# Patient Record
Sex: MALE | Race: WHITE | NOT HISPANIC OR LATINO
[De-identification: names, ages, dates, MRNs, and addresses within clinical notes are randomized per-mention and may not be internally consistent; named-entity substitution may affect disease eponyms.]

---

## 2017-04-13 ENCOUNTER — APPOINTMENT (OUTPATIENT)
Dept: ORTHOPEDIC SURGERY | Facility: CLINIC | Age: 37
End: 2017-04-13

## 2017-08-02 ENCOUNTER — RX RENEWAL (OUTPATIENT)
Age: 37
End: 2017-08-02

## 2017-08-04 ENCOUNTER — APPOINTMENT (OUTPATIENT)
Dept: ORTHOPEDIC SURGERY | Facility: CLINIC | Age: 37
End: 2017-08-04
Payer: OTHER MISCELLANEOUS

## 2017-08-04 VITALS — HEIGHT: 73 IN | WEIGHT: 215 LBS | BODY MASS INDEX: 28.49 KG/M2

## 2017-08-04 PROCEDURE — 99214 OFFICE O/P EST MOD 30 MIN: CPT

## 2017-08-04 RX ORDER — OMEGA-3-ACID ETHYL ESTERS CAPSULES 1 G/1
1 CAPSULE, LIQUID FILLED ORAL
Qty: 180 | Refills: 0 | Status: ACTIVE | COMMUNITY
Start: 2017-02-18

## 2017-10-19 ENCOUNTER — APPOINTMENT (OUTPATIENT)
Dept: ORTHOPEDIC SURGERY | Facility: CLINIC | Age: 37
End: 2017-10-19
Payer: OTHER MISCELLANEOUS

## 2017-10-19 PROCEDURE — 99214 OFFICE O/P EST MOD 30 MIN: CPT

## 2018-01-10 ENCOUNTER — TRANSCRIPTION ENCOUNTER (OUTPATIENT)
Age: 38
End: 2018-01-10

## 2018-02-13 ENCOUNTER — APPOINTMENT (OUTPATIENT)
Dept: ORTHOPEDIC SURGERY | Facility: CLINIC | Age: 38
End: 2018-02-13
Payer: OTHER MISCELLANEOUS

## 2018-02-13 ENCOUNTER — TRANSCRIPTION ENCOUNTER (OUTPATIENT)
Age: 38
End: 2018-02-13

## 2018-02-13 DIAGNOSIS — M54.5 LOW BACK PAIN: ICD-10-CM

## 2018-02-13 DIAGNOSIS — M54.6 PAIN IN THORACIC SPINE: ICD-10-CM

## 2018-02-13 PROCEDURE — 99214 OFFICE O/P EST MOD 30 MIN: CPT

## 2018-02-13 RX ORDER — KETOCONAZOLE 20 MG/G
2 GEL TOPICAL
Qty: 45 | Refills: 0 | Status: ACTIVE | COMMUNITY
Start: 2017-01-16

## 2018-02-13 RX ORDER — CICLOPIROX 10 MG/.96ML
1 SHAMPOO TOPICAL
Qty: 120 | Refills: 0 | Status: ACTIVE | COMMUNITY
Start: 2017-01-16

## 2018-02-13 RX ORDER — NAPROXEN 500 MG/1
500 TABLET ORAL
Qty: 60 | Refills: 5 | Status: ACTIVE | COMMUNITY
Start: 2017-10-19 | End: 1900-01-01

## 2018-05-08 ENCOUNTER — APPOINTMENT (OUTPATIENT)
Dept: ORTHOPEDIC SURGERY | Facility: CLINIC | Age: 38
End: 2018-05-08

## 2018-10-29 ENCOUNTER — APPOINTMENT (OUTPATIENT)
Dept: GASTROENTEROLOGY | Facility: CLINIC | Age: 38
End: 2018-10-29
Payer: COMMERCIAL

## 2018-10-29 VITALS
DIASTOLIC BLOOD PRESSURE: 73 MMHG | HEIGHT: 73 IN | SYSTOLIC BLOOD PRESSURE: 100 MMHG | HEART RATE: 65 BPM | OXYGEN SATURATION: 98 % | TEMPERATURE: 98.7 F | RESPIRATION RATE: 17 BRPM

## 2018-10-29 DIAGNOSIS — Z86.39 PERSONAL HISTORY OF OTHER ENDOCRINE, NUTRITIONAL AND METABOLIC DISEASE: ICD-10-CM

## 2018-10-29 DIAGNOSIS — Z78.9 OTHER SPECIFIED HEALTH STATUS: ICD-10-CM

## 2018-10-29 DIAGNOSIS — Z82.49 FAMILY HISTORY OF ISCHEMIC HEART DISEASE AND OTHER DISEASES OF THE CIRCULATORY SYSTEM: ICD-10-CM

## 2018-10-29 DIAGNOSIS — Z87.09 PERSONAL HISTORY OF OTHER DISEASES OF THE RESPIRATORY SYSTEM: ICD-10-CM

## 2018-10-29 PROCEDURE — 99244 OFF/OP CNSLTJ NEW/EST MOD 40: CPT

## 2018-11-08 ENCOUNTER — APPOINTMENT (OUTPATIENT)
Dept: GASTROENTEROLOGY | Facility: AMBULATORY MEDICAL SERVICES | Age: 38
End: 2018-11-08
Payer: COMMERCIAL

## 2018-11-08 PROCEDURE — 43239 EGD BIOPSY SINGLE/MULTIPLE: CPT

## 2018-12-03 ENCOUNTER — APPOINTMENT (OUTPATIENT)
Dept: GASTROENTEROLOGY | Facility: CLINIC | Age: 38
End: 2018-12-03
Payer: COMMERCIAL

## 2018-12-03 VITALS
WEIGHT: 184 LBS | BODY MASS INDEX: 24.39 KG/M2 | TEMPERATURE: 98.3 F | DIASTOLIC BLOOD PRESSURE: 56 MMHG | HEART RATE: 68 BPM | HEIGHT: 73 IN | OXYGEN SATURATION: 98 % | SYSTOLIC BLOOD PRESSURE: 98 MMHG

## 2018-12-03 DIAGNOSIS — R63.4 ABNORMAL WEIGHT LOSS: ICD-10-CM

## 2018-12-03 PROCEDURE — 99214 OFFICE O/P EST MOD 30 MIN: CPT

## 2018-12-15 ENCOUNTER — LABORATORY RESULT (OUTPATIENT)
Age: 38
End: 2018-12-15

## 2018-12-17 LAB
ALBUMIN SERPL ELPH-MCNC: 4.8 G/DL
ALP BLD-CCNC: 45 U/L
ALT SERPL-CCNC: 26 U/L
ANION GAP SERPL CALC-SCNC: 12 MMOL/L
APPEARANCE: CLEAR
AST SERPL-CCNC: 19 U/L
BACTERIA: NEGATIVE
BASOPHILS # BLD AUTO: 0.02 K/UL
BASOPHILS NFR BLD AUTO: 0.3 %
BILIRUB SERPL-MCNC: 0.8 MG/DL
BILIRUBIN URINE: NEGATIVE
BLOOD URINE: NEGATIVE
BUN SERPL-MCNC: 22 MG/DL
CALCIUM SERPL-MCNC: 9.9 MG/DL
CHLORIDE SERPL-SCNC: 101 MMOL/L
CHOLEST SERPL-MCNC: 184 MG/DL
CHOLEST/HDLC SERPL: 4.8 RATIO
CO2 SERPL-SCNC: 29 MMOL/L
COLOR: YELLOW
CREAT SERPL-MCNC: 0.86 MG/DL
EOSINOPHIL # BLD AUTO: 0.13 K/UL
EOSINOPHIL NFR BLD AUTO: 1.7 %
ERYTHROCYTE [SEDIMENTATION RATE] IN BLOOD BY WESTERGREN METHOD: 2 MM/HR
GLUCOSE QUALITATIVE U: NEGATIVE MG/DL
GLUCOSE SERPL-MCNC: 98 MG/DL
HCT VFR BLD CALC: 42.5 %
HDLC SERPL-MCNC: 38 MG/DL
HGB BLD-MCNC: 14.1 G/DL
HYALINE CASTS: 0 /LPF
IMM GRANULOCYTES NFR BLD AUTO: 0.3 %
KETONES URINE: NEGATIVE
LDLC SERPL CALC-MCNC: 123 MG/DL
LEUKOCYTE ESTERASE URINE: NEGATIVE
LYMPHOCYTES # BLD AUTO: 2.01 K/UL
LYMPHOCYTES NFR BLD AUTO: 27.1 %
MAN DIFF?: NORMAL
MCHC RBC-ENTMCNC: 28.9 PG
MCHC RBC-ENTMCNC: 33.2 GM/DL
MCV RBC AUTO: 87.1 FL
MICROSCOPIC-UA: NORMAL
MONOCYTES # BLD AUTO: 0.77 K/UL
MONOCYTES NFR BLD AUTO: 10.4 %
NEUTROPHILS # BLD AUTO: 4.48 K/UL
NEUTROPHILS NFR BLD AUTO: 60.2 %
NITRITE URINE: NEGATIVE
PH URINE: 5.5
PLATELET # BLD AUTO: 257 K/UL
POTASSIUM SERPL-SCNC: 3.8 MMOL/L
PROT SERPL-MCNC: 7.1 G/DL
PROTEIN URINE: NEGATIVE MG/DL
RBC # BLD: 4.88 M/UL
RBC # FLD: 12.9 %
RED BLOOD CELLS URINE: 0 /HPF
SODIUM SERPL-SCNC: 142 MMOL/L
SPECIFIC GRAVITY URINE: 1.02
SQUAMOUS EPITHELIAL CELLS: 0 /HPF
TRIGL SERPL-MCNC: 114 MG/DL
TSH SERPL-ACNC: 1.06 UIU/ML
TTG IGA SER IA-ACNC: <5 UNITS
TTG IGA SER-ACNC: NEGATIVE
TTG IGG SER IA-ACNC: <5 UNITS
TTG IGG SER IA-ACNC: NEGATIVE
UROBILINOGEN URINE: NEGATIVE MG/DL
WBC # FLD AUTO: 7.43 K/UL
WHITE BLOOD CELLS URINE: 1 /HPF

## 2018-12-18 LAB
ENDOMYSIUM IGA SER QL: NEGATIVE
ENDOMYSIUM IGA TITR SER: NORMAL

## 2018-12-19 LAB
GLIADIN IGA SER QL: <5 UNITS
GLIADIN IGG SER QL: <5 UNITS
GLIADIN PEPTIDE IGA SER-ACNC: NEGATIVE
GLIADIN PEPTIDE IGG SER-ACNC: NEGATIVE

## 2019-01-07 ENCOUNTER — APPOINTMENT (OUTPATIENT)
Dept: GASTROENTEROLOGY | Facility: CLINIC | Age: 39
End: 2019-01-07
Payer: COMMERCIAL

## 2019-01-07 VITALS
SYSTOLIC BLOOD PRESSURE: 118 MMHG | BODY MASS INDEX: 24.65 KG/M2 | WEIGHT: 186 LBS | HEIGHT: 73 IN | HEART RATE: 76 BPM | OXYGEN SATURATION: 99 % | TEMPERATURE: 98.7 F | DIASTOLIC BLOOD PRESSURE: 75 MMHG

## 2019-01-07 PROCEDURE — 99214 OFFICE O/P EST MOD 30 MIN: CPT

## 2019-01-07 RX ORDER — OMEPRAZOLE 40 MG/1
40 CAPSULE, DELAYED RELEASE ORAL
Qty: 30 | Refills: 3 | Status: ACTIVE | COMMUNITY
Start: 2018-12-03

## 2019-01-07 NOTE — PHYSICAL EXAM
[General Appearance - Alert] : alert [General Appearance - In No Acute Distress] : in no acute distress [Sclera] : the sclera and conjunctiva were normal [PERRL With Normal Accommodation] : pupils were equal in size, round, and reactive to light [Extraocular Movements] : extraocular movements were intact [Outer Ear] : the ears and nose were normal in appearance [Oropharynx] : the oropharynx was normal [Neck Appearance] : the appearance of the neck was normal [Neck Cervical Mass (___cm)] : no neck mass was observed [Jugular Venous Distention Increased] : there was no jugular-venous distention [Thyroid Diffuse Enlargement] : the thyroid was not enlarged [Thyroid Nodule] : there were no palpable thyroid nodules [Auscultation Breath Sounds / Voice Sounds] : lungs were clear to auscultation bilaterally [Heart Rate And Rhythm] : heart rate was normal and rhythm regular [Heart Sounds] : normal S1 and S2 [Heart Sounds Gallop] : no gallops [Murmurs] : no murmurs [Heart Sounds Pericardial Friction Rub] : no pericardial rub [Bowel Sounds] : normal bowel sounds [Abdomen Soft] : soft [Abdomen Tenderness] : non-tender [] : no hepato-splenomegaly [Abdomen Mass (___ Cm)] : no abdominal mass palpated [No CVA Tenderness] : no ~M costovertebral angle tenderness [No Spinal Tenderness] : no spinal tenderness [Abnormal Walk] : normal gait [Nail Clubbing] : no clubbing  or cyanosis of the fingernails [Musculoskeletal - Swelling] : no joint swelling seen [Motor Tone] : muscle strength and tone were normal [Oriented To Time, Place, And Person] : oriented to person, place, and time [Impaired Insight] : insight and judgment were intact [Affect] : the affect was normal

## 2019-01-07 NOTE — ASSESSMENT
[FreeTextEntry1] : Patient with mild gastritis and esophagitis. He will continue omeprazole 40 mg daily. He has been exposed to some noxious stimuli. There were old and they are playing in his vague constitutional symptoms is unclear.\par We will continue to monitor his weight. Recent blood work was perfectly normal.

## 2019-01-07 NOTE — HISTORY OF PRESENT ILLNESS
[FreeTextEntry1] : Patient is a 38-year-old gentleman who is status post an upper endoscopy. Endoscopy was essentially normal. Biopsies of the esophagus revealed evidence of reflux esophagitis and biopsies of the stomach revealed evidence of intestinal metaplasia. Patient may have been exposed to certain noxious stimuli. He is having some vague constitutional symptoms.\par Weight has been stable. Even gained 10lbs.

## 2019-02-10 ENCOUNTER — TRANSCRIPTION ENCOUNTER (OUTPATIENT)
Age: 39
End: 2019-02-10

## 2019-05-10 ENCOUNTER — TRANSCRIPTION ENCOUNTER (OUTPATIENT)
Age: 39
End: 2019-05-10

## 2019-05-21 ENCOUNTER — OUTPATIENT (OUTPATIENT)
Dept: OUTPATIENT SERVICES | Facility: HOSPITAL | Age: 39
LOS: 1 days | Discharge: HOME | End: 2019-05-21
Payer: COMMERCIAL

## 2019-05-21 DIAGNOSIS — H81.43 VERTIGO OF CENTRAL ORIGIN, BILATERAL: ICD-10-CM

## 2019-05-21 DIAGNOSIS — H90.3 SENSORINEURAL HEARING LOSS, BILATERAL: ICD-10-CM

## 2019-05-21 DIAGNOSIS — H69.83 OTHER SPECIFIED DISORDERS OF EUSTACHIAN TUBE, BILATERAL: ICD-10-CM

## 2019-05-21 DIAGNOSIS — R13.10 DYSPHAGIA, UNSPECIFIED: ICD-10-CM

## 2019-05-21 PROCEDURE — 74230 X-RAY XM SWLNG FUNCJ C+: CPT | Mod: 26

## 2019-05-21 NOTE — SWALLOW VFSS/MBS ASSESSMENT ADULT - PHARYNGEAL PHASE COMMENTS
Swallow was triggered in an acceptable time frame for age, Hyolaryngeal excursion and epiglottic retroflexion during the swallow were mildly reduced but felt to be functional with grossly adequate prandial airway protection nonetheless. Moreover, superior to inferior pharyngeal motility was mildly reduced but also felt to be functional. Cricopharyngeal sphincter opening was somewhat rigid but CP sphincter did readily open to allow passage of food materials into the esophagus without obstruction. NO LARYNGEAL PENETRATION OR ASPIRATION WERE DEMONSTRATED UNDER FLUOROSCOPIC CONTROL. HOWEVER, IT IS NOTED THAT A MILD AMOUNT OF POST PRANDIAL PHARYNGEAL STASIS WAS RETAINED IN THE VALLECULAE/PYRIFORM SINUSES POST PRANDIALLY WITH PUREED FOODS AS WELL AS SOLID FOOD MATERIALS. IT SHOULD BE NOTED THAT EMPLOYMENT OF CYCLIC INGESTION(I.E ALTERNATING LIQUIDS WITH MORE COARSE FOODS) AND/OR EMPLOYMENT OF A REPEAT DRY SWALLOW AFTER PRIMARY SWALLOWING TRIALS WAS EFFECTIVE IN CLEARING PHARYNGEAL RESIDUE. Swallow was triggered in an acceptable time frame for age, Hyolaryngeal excursion and epiglottic retroflexion during the swallow were mildly reduced but felt to be functional with grossly adequate prandial airway protection nonetheless. Moreover, superior to inferior pharyngeal motility was mildly reduced but also felt to be functional. Cricopharyngeal sphincter opening was somewhat rigid but CP sphincter did readily open to allow passage of food materials into the esophagus without obstruction. NO LARYNGEAL PENETRATION OR ASPIRATION WERE DEMONSTRATED UNDER FLUOROSCOPIC CONTROL. HOWEVER, IT IS NOTED THAT A MILD AMOUNT OF POST PRANDIAL PHARYNGEAL STASIS WAS RETAINED IN THE VALLECULAE/PYRIFORM SINUSES POST PRANDIALLY WITH PUREED FOODS AS WELL AS SOLID FOOD MATERIALS. IT SHOULD BE FURTHER NOTED THAT EMPLOYMENT OF CYCLIC INGESTION(I.E ALTERNATING LIQUIDS WITH MORE COARSE FOODS) AND/OR EMPLOYMENT OF A REPEAT DRY SWALLOW AFTER PRIMARY SWALLOWING TRIALS WAS EFFECTIVE IN CLEARING PHARYNGEAL RESIDUE.

## 2019-05-21 NOTE — SWALLOW VFSS/MBS ASSESSMENT ADULT - ADDITIONAL RECOMMENDATIONS
1) SUGGEST A REGULAR TEXTURE DIET WITH THIN LIQUID CONSISTENCIES AS THE PATIENT APPEARS CLINICALLY TOLERANT OF THESE FOOD CONSISTENCIES FROM AN OROPHARYNGEAL SWALLOWING STANCE ON EXAM, DESPITE MILD PHARYNGEAL DYSPHAGIA.     2) THE PATIENT SHOULD EAT AT A 90 DEGREE ANGLE AND REMAIN UPRIGHT AT LEAST 20 MINUTES AFTER MEALS. HE SHOULD ALSO ALTERNATE LIQUIDS WITH MORE COARSE FOODS OFTEN AND PERIODICALLY EMPLOY A DRY SWALLOW AFTER PRIMARY SWALLOWING TRIALS AT TIMES. THESE STRATEGIES ARE DESIGNED TO ENHANCE SWALLOWING SAFETY/EFFICIENCY.     3) CONSIDER A TRIAL PERIOD OF SWALLOWING THERAPY WITH A SPEECH PATHOLOGIST. I CAN BE CONTACTED FOR REFERRALS  [phone].    4) CONSIDER A CONSULT WITH A NEUROLOGIST TO RULE OUT A NEUROGENIC CAUSE OF HIS PHARYNGEAL DYSPHAGIA.     5) RECONSULT PRN.

## 2019-05-21 NOTE — SWALLOW VFSS/MBS ASSESSMENT ADULT - DIAGNOSTIC IMPRESSIONS
THE PATIENT DEMONSTRATES AGE ACCEPTABLE FUNCTIONAL ORAL SWALLOWING ATOP MILD PHARYNGEAL DYSPHAGIA WHICH IS FELT TO BE A FUNCTIONAL CONDITION. PHARYNGEAL DYSPHAGIA IS CHARACTERIZED BY MILDLY REDUCED BUT FUNCTIONAL HYOLARYNGEAL EXCURSION DURING SWALLOWING TRIALS AND MILD RIGIDITY OF CRICOPHARYNGEAL SPHINCTER UPON OPENING.  NO LARYNGEAL PENETRATION OR ASPIRATION WERE DEMONSTRATED UNDER FLUOROSCOPIC CONTROL NONETHELESS. HOWEVER, IT IS NOTED THAT A MILD AMOUNT OF POST PRANDIAL PHARYNGEAL STASIS WAS RETAINED IN THE VALLECULAE/PYRIFORM SINUSES POST PRANDIALLY WITH PUREED FOODS AS WELL AS SOLID FOOD MATERIALS. IT SHOULD BE NOTED THAT EMPLOYMENT OF CYCLIC INGESTION(I.E ALTERNATING LIQUIDS WITH MORE COARSE FOODS) AND/OR EMPLOYMENT OF A REPEAT DRY SWALLOW AFTER PRIMARY SWALLOWING TRIALS WAS EFFECTIVE IN CLEARING PHARYNGEAL RESIDUE. THE PATIENT DEMONSTRATES AGE ACCEPTABLE FUNCTIONAL ORAL SWALLOWING ATOP MILD PHARYNGEAL DYSPHAGIA WHICH IS FELT TO BE A FUNCTIONAL CONDITION. PHARYNGEAL DYSPHAGIA IS CHARACTERIZED BY MILDLY REDUCED BUT FUNCTIONAL HYOLARYNGEAL EXCURSION DURING SWALLOWING TRIALS AND MILD RIGIDITY OF CRICOPHARYNGEAL SPHINCTER UPON OPENING OF CP SPHINCTER WITHOUT OBSTRUCTION WHEN FOOD MATERIALS ENTERED HIS ESOPHAGUS.   NO LARYNGEAL PENETRATION OR ASPIRATION WERE DEMONSTRATED UNDER FLUOROSCOPIC CONTROL NONETHELESS. HOWEVER, IT IS NOTED THAT A MILD AMOUNT OF POST PRANDIAL PHARYNGEAL STASIS WAS RETAINED IN THE VALLECULAE/PYRIFORM SINUSES POST PRANDIALLY WITH PUREED FOODS AS WELL AS SOLID FOOD MATERIALS. IT SHOULD BE FURTHER NOTED THAT EMPLOYMENT OF CYCLIC INGESTION(I.E ALTERNATING LIQUIDS WITH MORE COARSE FOODS) AND/OR EMPLOYMENT OF A REPEAT DRY SWALLOW AFTER PRIMARY SWALLOWING TRIALS WAS EFFECTIVE IN CLEARING PHARYNGEAL RESIDUE.

## 2019-05-21 NOTE — SWALLOW VFSS/MBS ASSESSMENT ADULT - ORAL PHASE COMMENTS
Bolus formation/transfer were achieved via functional AP lingual actions and efficient rotary masticatory motions. No oral pooling was evident.

## 2019-05-21 NOTE — SWALLOW VFSS/MBS ASSESSMENT ADULT - COMMENTS
The patient was referred for an outpatient MBS by Dr. Al penn. Medical history is reportedly remarkable for bilateral hearing loss, positional vertigo, hypothyroidism and GERD. He reportedly underwent FEEST testing at Dr Penn which was reportedly unremarkable but specific findings were not able to be provided by the patient. The patient stated that he is on a regular texture diet. He reports that he often clears his throat post prandially. The patient was referred for an outpatient MBS by Dr. Al Go. Medical history is reportedly remarkable for bilateral hearing loss, positional vertigo, hypothyroidism and GERD. He reportedly underwent FEEST testing at Dr Go's office which was reportedly unremarkable but specific findings were not able to be provided by the patient. The patient stated that he is on a regular texture diet. He reports that he often clears his throat post prandially.

## 2019-05-21 NOTE — SWALLOW VFSS/MBS ASSESSMENT ADULT - ORAL PREP COMMENTS
The patient was oriented to feeding situ, willingly accepted PO and demonstrated functional labial grading on utensils. No dribbling was noted.

## 2019-08-05 ENCOUNTER — APPOINTMENT (OUTPATIENT)
Dept: GASTROENTEROLOGY | Facility: CLINIC | Age: 39
End: 2019-08-05
Payer: COMMERCIAL

## 2019-08-05 VITALS
HEIGHT: 73 IN | BODY MASS INDEX: 27.83 KG/M2 | DIASTOLIC BLOOD PRESSURE: 70 MMHG | HEART RATE: 68 BPM | SYSTOLIC BLOOD PRESSURE: 120 MMHG | OXYGEN SATURATION: 99 % | WEIGHT: 210 LBS | TEMPERATURE: 98.6 F

## 2019-08-05 VITALS
DIASTOLIC BLOOD PRESSURE: 80 MMHG | OXYGEN SATURATION: 99 % | BODY MASS INDEX: 24.52 KG/M2 | SYSTOLIC BLOOD PRESSURE: 120 MMHG | TEMPERATURE: 98.6 F | HEIGHT: 73 IN | WEIGHT: 185 LBS | HEART RATE: 78 BPM

## 2019-08-05 PROCEDURE — 99214 OFFICE O/P EST MOD 30 MIN: CPT

## 2019-08-05 NOTE — HISTORY OF PRESENT ILLNESS
[FreeTextEntry1] : Patient is a 38-year-old gentleman who is status post an upper endoscopy. Endoscopy was essentially normal. Biopsies of the esophagus revealed evidence of reflux esophagitis and biopsies of the stomach revealed evidence of intestinal metaplasia. He is still having some symptoms of dysphagia. He had these tests that was essentially normal. He had a barium swallow that revealed some evidence of pharyngeal dysphagia. He may have had a rigid cricopharyngeus muscle with some stasis but no penetration. He needs to clear his throat frequently. He denies any heartburn.\par Weight has been stable. Even gained 15lbs.

## 2019-08-05 NOTE — PHYSICAL EXAM
[General Appearance - Alert] : alert [General Appearance - In No Acute Distress] : in no acute distress [PERRL With Normal Accommodation] : pupils were equal in size, round, and reactive to light [Sclera] : the sclera and conjunctiva were normal [Extraocular Movements] : extraocular movements were intact [Outer Ear] : the ears and nose were normal in appearance [Oropharynx] : the oropharynx was normal [Neck Cervical Mass (___cm)] : no neck mass was observed [Neck Appearance] : the appearance of the neck was normal [Jugular Venous Distention Increased] : there was no jugular-venous distention [Thyroid Diffuse Enlargement] : the thyroid was not enlarged [Thyroid Nodule] : there were no palpable thyroid nodules [Auscultation Breath Sounds / Voice Sounds] : lungs were clear to auscultation bilaterally [Heart Rate And Rhythm] : heart rate was normal and rhythm regular [Heart Sounds] : normal S1 and S2 [Heart Sounds Gallop] : no gallops [Murmurs] : no murmurs [Bowel Sounds] : normal bowel sounds [Heart Sounds Pericardial Friction Rub] : no pericardial rub [Abdomen Soft] : soft [Abdomen Tenderness] : non-tender [] : no hepato-splenomegaly [Abdomen Mass (___ Cm)] : no abdominal mass palpated [No CVA Tenderness] : no ~M costovertebral angle tenderness [No Spinal Tenderness] : no spinal tenderness [Abnormal Walk] : normal gait [Musculoskeletal - Swelling] : no joint swelling seen [Nail Clubbing] : no clubbing  or cyanosis of the fingernails [Motor Tone] : muscle strength and tone were normal [Oriented To Time, Place, And Person] : oriented to person, place, and time [Impaired Insight] : insight and judgment were intact [Affect] : the affect was normal

## 2019-08-05 NOTE — ASSESSMENT
[FreeTextEntry1] : Patient was persistent oropharyngeal dysphagia symptoms. He will see a neurologist to see if there is a neurogenic cause.\par He may also have some spasm of this cricopharyngeus muscle. If his symptoms persist an esophageal motility study may be necessary.

## 2019-08-30 ENCOUNTER — OTHER (OUTPATIENT)
Age: 39
End: 2019-08-30

## 2019-09-05 ENCOUNTER — OTHER (OUTPATIENT)
Age: 39
End: 2019-09-05

## 2019-09-06 ENCOUNTER — OTHER (OUTPATIENT)
Age: 39
End: 2019-09-06

## 2019-09-29 ENCOUNTER — TRANSCRIPTION ENCOUNTER (OUTPATIENT)
Age: 39
End: 2019-09-29

## 2019-10-23 ENCOUNTER — OUTPATIENT (OUTPATIENT)
Dept: OUTPATIENT SERVICES | Facility: HOSPITAL | Age: 39
LOS: 1 days | Discharge: HOME | End: 2019-10-23
Payer: COMMERCIAL

## 2019-10-23 ENCOUNTER — APPOINTMENT (OUTPATIENT)
Dept: GASTROENTEROLOGY | Facility: HOSPITAL | Age: 39
End: 2019-10-23

## 2019-10-23 DIAGNOSIS — R13.12 DYSPHAGIA, OROPHARYNGEAL PHASE: ICD-10-CM

## 2019-10-23 PROCEDURE — 91010 ESOPHAGUS MOTILITY STUDY: CPT | Mod: 26,GC

## 2019-10-23 PROCEDURE — 91037 ESOPH IMPED FUNCTION TEST: CPT | Mod: 26,GC

## 2019-10-23 RX ORDER — LIDOCAINE 4 G/100G
10 CREAM TOPICAL ONCE
Refills: 0 | Status: DISCONTINUED | OUTPATIENT
Start: 2019-10-23 | End: 2019-11-09

## 2019-11-20 ENCOUNTER — APPOINTMENT (OUTPATIENT)
Dept: GASTROENTEROLOGY | Facility: CLINIC | Age: 39
End: 2019-11-20
Payer: COMMERCIAL

## 2019-11-20 VITALS
DIASTOLIC BLOOD PRESSURE: 70 MMHG | BODY MASS INDEX: 29.29 KG/M2 | HEART RATE: 68 BPM | TEMPERATURE: 97.4 F | OXYGEN SATURATION: 97 % | WEIGHT: 221 LBS | HEIGHT: 73 IN | SYSTOLIC BLOOD PRESSURE: 120 MMHG

## 2019-11-20 PROCEDURE — 99214 OFFICE O/P EST MOD 30 MIN: CPT

## 2019-11-20 RX ORDER — METOCLOPRAMIDE 10 MG/1
10 TABLET ORAL
Qty: 15 | Refills: 0 | Status: DISCONTINUED | COMMUNITY
Start: 2019-08-30 | End: 2019-11-20

## 2019-11-20 RX ORDER — PREDNISONE 10 MG
TABLET ORAL
Refills: 0 | Status: ACTIVE | COMMUNITY

## 2019-11-20 NOTE — ASSESSMENT
[FreeTextEntry1] : Patient with probable esophageal spasm. He did have intestinal metaplasia on biopsies of the stomach a year ago. If his symptoms persist, we will repeat the upper endoscopy.

## 2019-11-20 NOTE — PHYSICAL EXAM
[PERRL With Normal Accommodation] : pupils were equal in size, round, and reactive to light [General Appearance - Alert] : alert [General Appearance - In No Acute Distress] : in no acute distress [Sclera] : the sclera and conjunctiva were normal [Outer Ear] : the ears and nose were normal in appearance [Extraocular Movements] : extraocular movements were intact [Oropharynx] : the oropharynx was normal [Neck Cervical Mass (___cm)] : no neck mass was observed [Jugular Venous Distention Increased] : there was no jugular-venous distention [Neck Appearance] : the appearance of the neck was normal [Auscultation Breath Sounds / Voice Sounds] : lungs were clear to auscultation bilaterally [Thyroid Nodule] : there were no palpable thyroid nodules [Thyroid Diffuse Enlargement] : the thyroid was not enlarged [Heart Rate And Rhythm] : heart rate was normal and rhythm regular [Heart Sounds] : normal S1 and S2 [Murmurs] : no murmurs [Heart Sounds Gallop] : no gallops [Bowel Sounds] : normal bowel sounds [Abdomen Soft] : soft [Heart Sounds Pericardial Friction Rub] : no pericardial rub [Abdomen Mass (___ Cm)] : no abdominal mass palpated [No CVA Tenderness] : no ~M costovertebral angle tenderness [Abdomen Tenderness] : non-tender [] : no hepato-splenomegaly [Abnormal Walk] : normal gait [No Spinal Tenderness] : no spinal tenderness [Nail Clubbing] : no clubbing  or cyanosis of the fingernails [Oriented To Time, Place, And Person] : oriented to person, place, and time [Musculoskeletal - Swelling] : no joint swelling seen [Motor Tone] : muscle strength and tone were normal [Impaired Insight] : insight and judgment were intact [Affect] : the affect was normal

## 2019-11-20 NOTE — HISTORY OF PRESENT ILLNESS
[FreeTextEntry1] : Patient is status post esophageal motility study which was essentially normal. He did have some mildly high resting UES pressure. He still complains of occasional dysphagia or globus symptom after eating solids. Patient took prednisone for an upper respiratory infection and claimed that his swallowing and globus symptoms were better.\par Patient also has some mild bilateral upper abdominal discomfort. This usually occurs at work.

## 2020-05-04 ENCOUNTER — APPOINTMENT (OUTPATIENT)
Dept: GASTROENTEROLOGY | Facility: CLINIC | Age: 40
End: 2020-05-04
Payer: COMMERCIAL

## 2020-05-04 DIAGNOSIS — J30.9 ALLERGIC RHINITIS, UNSPECIFIED: ICD-10-CM

## 2020-05-04 PROCEDURE — 99214 OFFICE O/P EST MOD 30 MIN: CPT | Mod: 95

## 2020-05-04 RX ORDER — CETIRIZINE HYDROCHLORIDE 10 MG/1
10 TABLET, FILM COATED ORAL DAILY
Qty: 30 | Refills: 1 | Status: ACTIVE | COMMUNITY
Start: 2020-05-04 | End: 1900-01-01

## 2020-05-04 NOTE — REVIEW OF SYSTEMS
[Sore Throat] : sore throat [Hoarseness] : hoarseness [As Noted in HPI] : as noted in HPI [Negative] : Heme/Lymph

## 2020-05-04 NOTE — HISTORY OF PRESENT ILLNESS
[Home] : at home, [unfilled] , at the time of the visit. [Medical Office: ([facility])___] : at the medical office located in  [Patient] : the patient [Self] : self [___ Month(s) Ago] : [unfilled] month(s) ago [FreeTextEntry1] : Patient is status post esophageal motility study which was essentially normal.   Still c/o some sore throat, hoarseness when talking, clears throat frequently, and some increase saliva causing cough. No relief with amitriptyline. Tried Lansoperazole BID without improvement. Taking Famotidine 20mg in evening. No dysphagia.\par EGD in past revealed inflammation in esophagus c/w reflux and also intestinal metaplasia in antrum.

## 2021-02-03 ENCOUNTER — RX RENEWAL (OUTPATIENT)
Age: 41
End: 2021-02-03

## 2021-02-07 ENCOUNTER — TRANSCRIPTION ENCOUNTER (OUTPATIENT)
Age: 41
End: 2021-02-07

## 2021-03-24 ENCOUNTER — APPOINTMENT (OUTPATIENT)
Dept: GASTROENTEROLOGY | Facility: CLINIC | Age: 41
End: 2021-03-24
Payer: COMMERCIAL

## 2021-03-24 VITALS
TEMPERATURE: 98.3 F | WEIGHT: 219 LBS | HEIGHT: 73 IN | HEART RATE: 86 BPM | SYSTOLIC BLOOD PRESSURE: 130 MMHG | OXYGEN SATURATION: 97 % | BODY MASS INDEX: 29.03 KG/M2 | DIASTOLIC BLOOD PRESSURE: 80 MMHG

## 2021-03-24 DIAGNOSIS — R13.12 DYSPHAGIA, OROPHARYNGEAL PHASE: ICD-10-CM

## 2021-03-24 PROCEDURE — 99072 ADDL SUPL MATRL&STAF TM PHE: CPT

## 2021-03-24 PROCEDURE — 99214 OFFICE O/P EST MOD 30 MIN: CPT

## 2021-03-24 RX ORDER — LANSOPRAZOLE 30 MG/1
30 CAPSULE, DELAYED RELEASE ORAL DAILY
Qty: 90 | Refills: 3 | Status: ACTIVE | COMMUNITY
Start: 2020-05-04

## 2021-03-24 NOTE — HISTORY OF PRESENT ILLNESS
[FreeTextEntry1] : Patient is a 40 y/o male who is status post esophageal motility study which was essentially normal in 2019.  He continues to take lansoprazole dissolvable tablets in the morning and famotidine in the evening with improvement in his symptoms.  He also has been taking amitriptyline and when he stops amitriptyline he does develop some recurrence of his symptoms along with some atypical chest pain.\par Recently he has been complaining of some chest pressure which can occur sometimes with exertion.  He has been having these symptoms for several months.  He saw a cardiologist 2 years ago and apparently had a negative work-up.. No dysphagia.\par EGD in past revealed inflammation in esophagus c/w reflux and also intestinal metaplasia in antrum.

## 2021-03-24 NOTE — ASSESSMENT
[FreeTextEntry1] : Patient with atypical chest pain and atypical GERD symptoms.  He possibly has some esophageal spasm.  He does have difficulty swallowing and takes lansoprazole ODT tablets which seemed to help.  He will continue famotidine in the evening and amitriptyline 10 mg nightly.\par Over the past several months he has had some exertional type chest pain and was told to contact his cardiologist.\par He did have intestinal metaplasia on a previous upper endoscopy and will need to have a follow-up upper endoscopy but once he is cleared by his cardiologist.

## 2021-05-21 ENCOUNTER — RX RENEWAL (OUTPATIENT)
Age: 41
End: 2021-05-21

## 2021-08-04 DIAGNOSIS — Z01.818 ENCOUNTER FOR OTHER PREPROCEDURAL EXAMINATION: ICD-10-CM

## 2021-08-17 ENCOUNTER — TRANSCRIPTION ENCOUNTER (OUTPATIENT)
Age: 41
End: 2021-08-17

## 2021-08-19 ENCOUNTER — APPOINTMENT (OUTPATIENT)
Dept: GASTROENTEROLOGY | Facility: AMBULATORY MEDICAL SERVICES | Age: 41
End: 2021-08-19
Payer: COMMERCIAL

## 2021-08-19 PROCEDURE — 43239 EGD BIOPSY SINGLE/MULTIPLE: CPT

## 2021-10-11 ENCOUNTER — APPOINTMENT (OUTPATIENT)
Dept: GASTROENTEROLOGY | Facility: CLINIC | Age: 41
End: 2021-10-11
Payer: COMMERCIAL

## 2021-10-11 PROCEDURE — 99442: CPT

## 2021-10-12 RX ORDER — LEVOTHYROXINE SODIUM 175 UG/1
175 TABLET ORAL
Qty: 90 | Refills: 0 | Status: ACTIVE | COMMUNITY
Start: 2021-09-22

## 2021-10-12 NOTE — HISTORY OF PRESENT ILLNESS
[Home] : at home, [unfilled] , at the time of the visit. [Medical Office: ([facility])___] : at the medical office located in  [Verbal consent obtained from patient] : the patient, [unfilled] [FreeTextEntry1] : Patient is a 40 y/o male who is status post esophageal motility study which was essentially normal in 2019.  He continues to take lansoprazole dissolvable tablets in the morning and famotidine in the evening with improvement in his symptoms.  He also has been taking amitriptyline and when he stops amitriptyline he does develop some recurrence of his symptoms along with some atypical chest pain. Symptoms worse especially after ETOH or coffee.\par Recently he has been complaining of some chest pressure which can occur sometimes with exertion.  He has been having these symptoms for several months.  He saw a cardiologist 2 years ago and apparently had a negative work-up.. No dysphagia.\par EGD in past revealed inflammation in esophagus c/w reflux and also intestinal metaplasia in antrum.\par \par Patient is status post an upper endoscopy in August.  This did not reveal any intestinal metaplasia.  H. pylori was negative.  Esophageal biopsies were normal.\par \par \par \par

## 2021-10-12 NOTE — ASSESSMENT
[FreeTextEntry1] : Patient with improvement of the symptoms.  He seems to be doing well on Prevacid in the morning and famotidine in the evening and amitriptyline at bedtime.  Alcohol and coffee can be triggers for his symptoms.  If his symptoms persist, we will consider a Bravo test.  He will be seen in follow-up in 6 months.\par \par Time spent in counseling 15 min.

## 2022-01-31 ENCOUNTER — RX RENEWAL (OUTPATIENT)
Age: 42
End: 2022-01-31

## 2022-05-18 ENCOUNTER — RX RENEWAL (OUTPATIENT)
Age: 42
End: 2022-05-18

## 2022-05-31 ENCOUNTER — RX RENEWAL (OUTPATIENT)
Age: 42
End: 2022-05-31

## 2022-08-10 ENCOUNTER — APPOINTMENT (OUTPATIENT)
Dept: GASTROENTEROLOGY | Facility: CLINIC | Age: 42
End: 2022-08-10

## 2022-08-10 VITALS
WEIGHT: 219 LBS | SYSTOLIC BLOOD PRESSURE: 130 MMHG | HEIGHT: 73 IN | TEMPERATURE: 98 F | BODY MASS INDEX: 29.03 KG/M2 | DIASTOLIC BLOOD PRESSURE: 84 MMHG | OXYGEN SATURATION: 97 % | HEART RATE: 74 BPM

## 2022-08-10 DIAGNOSIS — R07.89 OTHER CHEST PAIN: ICD-10-CM

## 2022-08-10 DIAGNOSIS — K64.9 UNSPECIFIED HEMORRHOIDS: ICD-10-CM

## 2022-08-10 PROCEDURE — 99214 OFFICE O/P EST MOD 30 MIN: CPT

## 2022-08-10 RX ORDER — ICOSAPENT ETHYL 1 G/1
1 CAPSULE ORAL
Qty: 360 | Refills: 0 | Status: ACTIVE | COMMUNITY
Start: 2022-06-03

## 2022-08-10 RX ORDER — PREDNISONE 20 MG/1
20 TABLET ORAL
Qty: 5 | Refills: 0 | Status: ACTIVE | COMMUNITY
Start: 2022-07-28

## 2022-08-10 RX ORDER — AMOXICILLIN 875 MG/1
875 TABLET, FILM COATED ORAL
Qty: 20 | Refills: 0 | Status: ACTIVE | COMMUNITY
Start: 2022-07-28

## 2022-08-14 PROBLEM — R07.89 ATYPICAL CHEST PAIN: Status: ACTIVE | Noted: 2018-10-29

## 2022-08-14 PROBLEM — K64.9 HEMORRHOIDS, UNSPECIFIED HEMORRHOID TYPE: Status: ACTIVE | Noted: 2022-08-14

## 2022-08-14 RX ORDER — HYDROCORTISONE 2.5% 25 MG/G
2.5 CREAM TOPICAL TWICE DAILY
Qty: 1 | Refills: 1 | Status: ACTIVE | COMMUNITY
Start: 2022-08-14 | End: 1900-01-01

## 2022-08-14 NOTE — REVIEW OF SYSTEMS
[Hoarseness] : hoarseness [Chest Pain] : chest pain [As Noted in HPI] : as noted in HPI [Negative] : Heme/Lymph

## 2022-08-14 NOTE — HISTORY OF PRESENT ILLNESS
[FreeTextEntry1] : Patient is a 41 y/o male who is status post esophageal motility study which was essentially normal in 2019.  He continues to take lansoprazole dissolvable tablets in the morning and famotidine in the evening with improvement in his symptoms.  He also has been taking amitriptyline and when he stops amitriptyline he does develop some recurrence of his symptoms along with some atypical chest pain. Symptoms worse especially after ETOH or coffee.\par EGD in past revealed inflammation in esophagus c/w reflux and also intestinal metaplasia in antrum.\par \par Patient is status post an upper endoscopy in August 2021.  This did not reveal any intestinal metaplasia.  H. pylori was negative.  Esophageal biopsies were normal.\par \par Patient had bout of COVID last December and again about 1 month ago.  He complains of occasional heartburn especially with coffee.  When he takes the medications he does have relief of his symptoms.  He does have some hoarseness and frequent clearing of his throat.  His bowel movements are normal.\par \par He does feel a lump in the anal area.  He thinks this may be a thrombosed hemorrhoid.\par \par \par \par

## 2022-08-14 NOTE — ASSESSMENT
[FreeTextEntry1] : Patient with reflux symptoms including LPR symptoms and esophageal spasm.  He is doing well on famotidine in the evening and lansoprazole ODT at bedtime.  He continues to do well on amitriptyline nightly.\par Patient had COVID last December and again 1 month ago.\par He did note a lump in the anal area.  On inspection this seems to be an external tag.  A hemorrhoidal cream will be ordered.

## 2023-02-13 ENCOUNTER — APPOINTMENT (OUTPATIENT)
Dept: GASTROENTEROLOGY | Facility: CLINIC | Age: 43
End: 2023-02-13

## 2023-04-24 ENCOUNTER — APPOINTMENT (OUTPATIENT)
Dept: GASTROENTEROLOGY | Facility: CLINIC | Age: 43
End: 2023-04-24
Payer: COMMERCIAL

## 2023-04-24 VITALS
SYSTOLIC BLOOD PRESSURE: 133 MMHG | HEART RATE: 71 BPM | TEMPERATURE: 98.3 F | WEIGHT: 230 LBS | BODY MASS INDEX: 30.48 KG/M2 | HEIGHT: 73 IN | OXYGEN SATURATION: 97 % | DIASTOLIC BLOOD PRESSURE: 83 MMHG

## 2023-04-24 DIAGNOSIS — K31.A0 GASTRIC INTESTINAL METAPLASIA, UNSPECIFIED: ICD-10-CM

## 2023-04-24 DIAGNOSIS — Z12.11 ENCOUNTER FOR SCREENING FOR MALIGNANT NEOPLASM OF COLON: ICD-10-CM

## 2023-04-24 PROCEDURE — 99214 OFFICE O/P EST MOD 30 MIN: CPT

## 2023-04-24 RX ORDER — SODIUM PICOSULFATE, MAGNESIUM OXIDE, AND ANHYDROUS CITRIC ACID 10; 3.5; 12 MG/160ML; G/160ML; G/160ML
10-3.5-12 MG-GM LIQUID ORAL
Qty: 1 | Refills: 0 | Status: ACTIVE | COMMUNITY
Start: 2023-04-24 | End: 1900-01-01

## 2023-04-24 NOTE — PHYSICAL EXAM
[Alert] : alert [Normal Voice/Communication] : normal voice/communication [Healthy Appearing] : healthy appearing [No Acute Distress] : no acute distress [Sclera] : the sclera and conjunctiva were normal [Hearing Threshold Finger Rub Not [place]] : hearing was normal [Normal Lips/Gums] : the lips and gums were normal [Oropharynx] : the oropharynx was normal [Normal Appearance] : the appearance of the neck was normal [No Neck Mass] : no neck mass was observed [No Respiratory Distress] : no respiratory distress [No Acc Muscle Use] : no accessory muscle use [Respiration, Rhythm And Depth] : normal respiratory rhythm and effort [Auscultation Breath Sounds / Voice Sounds] : lungs were clear to auscultation bilaterally [Heart Rate And Rhythm] : heart rate was normal and rhythm regular [Normal S1, S2] : normal S1 and S2 [Murmurs] : no murmurs [Abdomen Tenderness] : non-tender [Bowel Sounds] : normal bowel sounds [No Masses] : no abdominal mass palpated [Abdomen Soft] : soft [] : no hepatosplenomegaly [No CVA Tenderness] : no CVA  tenderness [No Spinal Tenderness] : no spinal tenderness [Abnormal Walk] : normal gait [Oriented To Time, Place, And Person] : oriented to person, place, and time

## 2023-04-24 NOTE — HISTORY OF PRESENT ILLNESS
[FreeTextEntry1] : Patient is a 44 y/o male who is status post esophageal motility study which was essentially normal in 2019.  He continues to take lansoprazole dissolvable tablets in the morning and famotidine in the evening with improvement in his symptoms.  He also has been taking amitriptyline and when he stops amitriptyline he does develop some recurrence of his symptoms along with some atypical chest pain. Symptoms worse especially after ETOH or coffee.\par EGD in past revealed inflammation in esophagus c/w reflux and also intestinal metaplasia in antrum.\par \par Patient is status post an upper endoscopy in August 2021.  This did not reveal any intestinal metaplasia.  H. pylori was negative.  Esophageal biopsies were normal.\par \par 4/24/2023-patient continues to have laryngeal associated symptoms.  He is taking amitriptyline 10 mg nightly and lansoprazole ODT at night.  His GERD symptoms seem to be better but he complains of postprandial cough and frequent clearing of his throat.  He also takes famotidine before dinner.\par 3 months ago he ate a bagel and developed some discomfort and soreness in his throat.  He has no dysphagia.\par \par Patient is also inquiring about a screening colonoscopy.\par \par \par \par \par \par

## 2023-04-24 NOTE — ASSESSMENT
[FreeTextEntry1] : Patient with history of GERD and LPR symptoms.  His last endoscopy did not reveal any intestinal metaplasia of the gastric mucosa.  He had no hiatal hernia or patulous LES.\par He does complain of postprandial cough and frequent clearing of his throat.  He does have an appointment with the ENT in May.  I asked him to have a copy of the report sent to my office.\par He will be scheduled for his screening colonoscopy.  If ENT deems that an upper endoscopy is necessary, this will be scheduled at the time of the colonoscopy.\par We did renew his lansoprazole and amitriptyline.  He continues to take famotidine.

## 2023-04-24 NOTE — REASON FOR VISIT
[Follow-up] : a follow-up of an existing diagnosis [FreeTextEntry1] : GERD, LPR symptoms, Screen for colon cancer

## 2023-07-25 ENCOUNTER — RX RENEWAL (OUTPATIENT)
Age: 43
End: 2023-07-25

## 2023-07-25 RX ORDER — FAMOTIDINE 40 MG/1
40 TABLET, FILM COATED ORAL
Qty: 90 | Refills: 3 | Status: ACTIVE | COMMUNITY
Start: 2020-05-04 | End: 1900-01-01

## 2023-08-18 RX ORDER — SODIUM SULFATE, POTASSIUM SULFATE AND MAGNESIUM SULFATE 1.6; 3.13; 17.5 G/177ML; G/177ML; G/177ML
17.5-3.13-1.6 SOLUTION ORAL
Qty: 1 | Refills: 0 | Status: ACTIVE | COMMUNITY
Start: 2023-08-18 | End: 1900-01-01

## 2023-09-26 ENCOUNTER — APPOINTMENT (OUTPATIENT)
Dept: GASTROENTEROLOGY | Facility: AMBULATORY MEDICAL SERVICES | Age: 43
End: 2023-09-26
Payer: COMMERCIAL

## 2023-09-26 PROCEDURE — 45378 DIAGNOSTIC COLONOSCOPY: CPT | Mod: PT

## 2023-09-26 PROCEDURE — 43239 EGD BIOPSY SINGLE/MULTIPLE: CPT | Mod: 59

## 2023-11-03 ENCOUNTER — APPOINTMENT (OUTPATIENT)
Dept: GASTROENTEROLOGY | Facility: CLINIC | Age: 43
End: 2023-11-03
Payer: COMMERCIAL

## 2023-11-03 VITALS
HEART RATE: 72 BPM | HEIGHT: 73 IN | OXYGEN SATURATION: 97 % | TEMPERATURE: 97.9 F | WEIGHT: 230 LBS | DIASTOLIC BLOOD PRESSURE: 84 MMHG | BODY MASS INDEX: 30.48 KG/M2 | SYSTOLIC BLOOD PRESSURE: 130 MMHG

## 2023-11-03 DIAGNOSIS — K21.9 GASTRO-ESOPHAGEAL REFLUX DISEASE W/OUT ESOPHAGITIS: ICD-10-CM

## 2023-11-03 DIAGNOSIS — K31.A0 GASTRIC INTESTINAL METAPLASIA, UNSPECIFIED: ICD-10-CM

## 2023-11-03 DIAGNOSIS — K22.4 DYSKINESIA OF ESOPHAGUS: ICD-10-CM

## 2023-11-03 PROCEDURE — 99214 OFFICE O/P EST MOD 30 MIN: CPT

## 2023-11-03 RX ORDER — LANSOPRAZOLE 30 MG/1
30 TABLET, ORALLY DISINTEGRATING ORAL
Qty: 90 | Refills: 3 | Status: ACTIVE | COMMUNITY
Start: 2021-03-24 | End: 1900-01-01

## 2023-11-03 RX ORDER — AMITRIPTYLINE HYDROCHLORIDE 10 MG/1
10 TABLET, FILM COATED ORAL
Qty: 90 | Refills: 3 | Status: DISCONTINUED | COMMUNITY
Start: 2019-11-20 | End: 2023-11-03

## 2024-11-01 ENCOUNTER — NON-APPOINTMENT (OUTPATIENT)
Age: 44
End: 2024-11-01

## 2024-11-01 ENCOUNTER — APPOINTMENT (OUTPATIENT)
Dept: GASTROENTEROLOGY | Facility: CLINIC | Age: 44
End: 2024-11-01
Payer: COMMERCIAL

## 2024-11-01 VITALS
TEMPERATURE: 98 F | OXYGEN SATURATION: 94 % | SYSTOLIC BLOOD PRESSURE: 116 MMHG | HEART RATE: 72 BPM | HEIGHT: 73 IN | WEIGHT: 236 LBS | RESPIRATION RATE: 17 BRPM | DIASTOLIC BLOOD PRESSURE: 72 MMHG | BODY MASS INDEX: 31.28 KG/M2

## 2024-11-01 DIAGNOSIS — K31.A0 GASTRIC INTESTINAL METAPLASIA, UNSPECIFIED: ICD-10-CM

## 2024-11-01 DIAGNOSIS — K21.9 GASTRO-ESOPHAGEAL REFLUX DISEASE W/OUT ESOPHAGITIS: ICD-10-CM

## 2024-11-01 PROCEDURE — 99214 OFFICE O/P EST MOD 30 MIN: CPT
